# Patient Record
Sex: FEMALE | Race: WHITE
[De-identification: names, ages, dates, MRNs, and addresses within clinical notes are randomized per-mention and may not be internally consistent; named-entity substitution may affect disease eponyms.]

---
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
## 2018-04-11 NOTE — EMERGENCY ROOM VISIT NOTE
History


Report prepared by Acosta:  Ruth Singh


Under the Supervision of:  Dr. Saravanan Nguyen M.D.


First contact with patient:  14:07


Chief Complaint:  VOMITING


Stated Complaint:  SEVERE BACK PAIN,DIZZY,VOMITING,DIARRHEA





History of Present Illness


The patient is a 18 year old female who presents to the Emergency Room with 

complaints of nausea, vomiting, and diarrhea beginning 2 days ago. She states 

that her vomit has been green and yellow and states that she called her doctor 

who referred her here. The patient states that 3 weeks ago she also vomited 

blood. The patient reports having 5 episodes of diarrhea today and 5 episodes 

of diarrhea yesterday and reports that she is sometimes unable to make it to 

the bathroom. She also reports having the sweats, chills, fatigue, abdominal 

pain, cough, congestion and back pain. The patient reports that she feels like 

she has had to pee but is unable to, but denies burning with urination. She 

reports alcohol use about twice per week and states that she usually drinks 

about 5 beers when she goes out. The patient states that she used to be fine 

with 5 beers but states that recently she has been passing out after drinking 

that much. The patient states that she still has been drinking over the last 

several weeks even though she does has not been feeling well. She also reports 

smoking a pack of cigarettes per day. 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 

[employer] and states that she went home from work early because her feet 

were tingling and numb. She denies being around anyone sick at work. The 

patient states that about a month ago she was on medication for Herpes. She 

states that two years ago she was told she might have irritable bowel syndrome. 

The patient states that she is currently not on any medications daily.





   Source of History:  patient


   Onset:  2 days ago


   Position:  other (global)


   Quality:  other (nausea, vomiting, diarrhea )


   Associated Symptoms:  + fevers (sweats), + chills, + cough, + vomiting (

vomited blood 3 weeks ago ), + abdominal pain, + back pain, + fatigue, No 

urinary symptoms (burning with urination )





Review of Systems


See HPI for pertinent positives and negatives.  A total of ten systems were 

reviewed and were otherwise negative.





Past Medical & Surgical


Medical Problems:


(1) Asthma








Family History





Cancer


Diabetes mellitus


Heart disease


Hypertension





Social History


Smoking Status:  Current Every Day Smoker


Alcohol Use:  heavy


Drug Use:  none


Housing Status:  lives with family


Occupation Status:  employed





Current/Historical Medications


Scheduled


Famotidine (Pepcid), 20 MG PO BID


Ondasetron Odt (Zofran Odt), 4 MG SL Q6H





Allergies


Coded Allergies:  


     No Known Allergies (Unverified , 4/8/17)





Physical Exam


Vital Signs











  Date Time  Temp Pulse Resp B/P (MAP) Pulse Ox O2 Delivery O2 Flow Rate FiO2


 


4/11/18 17:12 36.7 77 20 132/84 100   


 


4/11/18 16:47  77 20 132/84 100 Room Air  


 


4/11/18 15:45  77 20  100 Room Air  


 


4/11/18 15:15  83 20  100 Room Air  


 


4/11/18 15:10    133/87    


 


4/11/18 15:00  83 22  99 Room Air  


 


4/11/18 14:35  76      


 


4/11/18 14:05 36.7 111 16 153/92 99 Room Air  











Physical Exam


GENERAL: Awake, alert, fatigued-appearing, in no distress


HENT: Normocephalic, atraumatic. Dry mucous membranes, otherwise oropharynx 

unremarkable.


EYES: Normal conjunctiva. Sclera non-icteric.


NECK: Supple. No nuchal rigidity. FROM. No JVD.


RESPIRATORY: Clear to auscultation.


CARDIAC: Sinus tachycardia. Extremities warm and well perfused. Pulses equal.


ABDOMEN: Soft, non-distended. Mild generalized abdominal pain. No peritoneal 

signs. Negative Zambrano's sign. No rebound or guarding. No masses.


RECTAL: Deferred.


MUSCULOSKELETAL: Chest examination reveals no tenderness. The back is 

symmetrical on inspection without obvious abnormality. There is no CVA 

tenderness to palpation. No joint edema. 


LOWER EXTREMITIES: Calves are equal size bilaterally and non-tender. No edema. 

No discoloration. 


NEURO: Normal sensorium. No sensory or motor deficits noted. 


SKIN: No rash or jaundice noted.





Medical Decision & Procedures


Laboratory Results


4/11/18 14:25








Red Blood Count 4.67, Mean Corpuscular Volume 92.7, Mean Corpuscular Hemoglobin 

33.2, Mean Corpuscular Hemoglobin Concent 35.8, Mean Platelet Volume 10.8, 

Neutrophils (%) (Auto) 66.8, Lymphocytes (%) (Auto) 21.9, Monocytes (%) (Auto) 

9.5, Eosinophils (%) (Auto) 1.4, Basophils (%) (Auto) 0.2, Neutrophils # (Auto) 

2.89, Lymphocytes # (Auto) 0.95, Monocytes # (Auto) 0.41, Eosinophils # (Auto) 

0.06, Basophils # (Auto) 0.01





4/11/18 14:25

















Test


  4/11/18


14:25 4/11/18


14:30 4/11/18


16:55


 


White Blood Count


  4.33 K/uL


(4.8-10.8) 


  


 


 


Red Blood Count


  4.67 M/uL


(4.2-5.4) 


  


 


 


Hemoglobin


  15.5 g/dL


(12.0-16.0) 


  


 


 


Hematocrit 43.3 % (37-47)   


 


Mean Corpuscular Volume


  92.7 fL


() 


  


 


 


Mean Corpuscular Hemoglobin


  33.2 pg


(25-34) 


  


 


 


Mean Corpuscular Hemoglobin


Concent 35.8 g/dl


(32-36) 


  


 


 


Platelet Count


  154 K/uL


(130-400) 


  


 


 


Mean Platelet Volume


  10.8 fL


(7.4-10.4) 


  


 


 


Neutrophils (%) (Auto) 66.8 %   


 


Lymphocytes (%) (Auto) 21.9 %   


 


Monocytes (%) (Auto) 9.5 %   


 


Eosinophils (%) (Auto) 1.4 %   


 


Basophils (%) (Auto) 0.2 %   


 


Neutrophils # (Auto)


  2.89 K/uL


(1.4-6.5) 


  


 


 


Lymphocytes # (Auto)


  0.95 K/uL


(1.2-3.4) 


  


 


 


Monocytes # (Auto)


  0.41 K/uL


(0.11-0.59) 


  


 


 


Eosinophils # (Auto)


  0.06 K/uL


(0-0.5) 


  


 


 


Basophils # (Auto)


  0.01 K/uL


(0-0.2) 


  


 


 


RDW Standard Deviation


  43.5 fL


(36.4-46.3) 


  


 


 


RDW Coefficient of Variation


  12.9 %


(11.5-14.5) 


  


 


 


Immature Granulocyte % (Auto) 0.2 %   


 


Immature Granulocyte # (Auto)


  0.01 K/uL


(0.00-0.02) 


  


 


 


Urine Color VIPIN   


 


Urine Appearance


  SL CLOUDY


(CLEAR) 


  


 


 


Urine pH 6.5 (4.5-7.5)   


 


Urine Specific Gravity


  1.020


(1.000-1.030) 


  


 


 


Urine Protein 2+ (NEG)   


 


Urine Glucose (UA) NEG (NEG)   


 


Urine Ketones TRACE (NEG)   


 


Urine Occult Blood 3+ (NEG)   


 


Urine Nitrite NEG (NEG)   


 


Urine Bilirubin 1+ (NEG)   


 


Urine Urobilinogen NEG (NEG)   


 


Urine Leukocyte Esterase TRACE (NEG)   


 


Urine RBC


  5-10 /hpf


(0-4) 


  


 


 


Urine WBC 1-5 /hpf (0-5)   


 


Urine Epithelial Cells >30 /lpf (0-5)   


 


Urine Bacteria NEG (NEG)   


 


Urine Mucus


  PRESENT (NONE


PRSENT) 


  


 


 


Urine Pregnancy Test NEG (NEG)   


 


Anion Gap


  7.0 mmol/L


(3-11) 


  


 


 


Est Creatinine Clear Calc


Drug Dose 70.7 ml/min 


  


  


 


 


Estimated GFR (


American) 88.5 


  


  


 


 


Estimated GFR (Non-


American 76.4 


  


  


 


 


BUN/Creatinine Ratio 12.0 (10-20)   


 


Calcium Level


  9.0 mg/dl


(8.5-10.1) 


  


 


 


Total Bilirubin


  0.8 mg/dl


(0.2-1) 


  


 


 


Direct Bilirubin


  0.2 mg/dl


(0-0.2) 


  


 


 


Aspartate Amino Transf


(AST/SGOT) 16 U/L (15-37) 


  


  


 


 


Alanine Aminotransferase


(ALT/SGPT) 21 U/L (12-78) 


  


  


 


 


Alkaline Phosphatase


  98 U/L


() 


  


 


 


Total Protein


  7.5 gm/dl


(6.4-8.2) 


  


 


 


Albumin


  4.0 gm/dl


(3.4-5.0) 


  


 


 


Lipase


  87 U/L


() 


  


 


 


Influenza Type A (RT-PCR)


  


  Neg for Influ


A (NEG) 


 


 


Influenza Type B (RT-PCR)


  


  Neg for Influ


B (NEG) 


 





Laboratory results reviewed by me





Medications Administered











 Medications


  (Trade)  Dose


 Ordered  Sig/Marielos


 Route  Start Time


 Stop Time Status Last Admin


Dose Admin


 


 Sodium Chloride  2,000 ml @ 


 999 mls/hr  Q2H1M STAT


 IV  4/11/18 14:18


 4/11/18 16:18 DC 4/11/18 14:34


999 MLS/HR


 


 Ondansetron HCl


  (Zofran Inj)  4 mg  NOW  STAT


 IV  4/11/18 14:18


 4/11/18 14:22 DC 4/11/18 14:33


4 MG


 


 Famotidine


  (Pepcid Tab)  20 mg  NOW  ONCE


 PO  4/11/18 14:30


 4/11/18 14:31 DC 4/11/18 14:33


20 MG











ED Course


1409: The patient was evaluated in room B9. A complete history and physical 

exam was performed.





1629: I reevaluated the patient and she is feeling better. 





1657: I reevaluated the patient. Discussed results and discharge instructions: 

She verbalized understanding and agreement. The patient is ready for discharge.





Medical Decision


I reviewed the patient's past medical history, medications, and the nursing 

notes as described above.





Differential diagnosis:


Etiologies such as appendicitis, diverticulitis, PUD, biliary pathology, UTI, 

pancreatitis, obstruction, mesenteric ischemia, aortic pathology, infections, 

inflammatory bowel disease, renal colic, as well as others were entertained. 





The patient is a 18-year-old woman who presents emergency department with 

complaint of nausea vomiting diarrhea over the past couple of days in the 

setting of feeling generally fatigued with decreased energy over the past few 

weeks with a single episode of blood-tinged emesis 3 weeks ago per hpi.  Of note

, the patient reports that she does drink fairly regularly however "over the 

past few weeks she has drank 5 beers and she feels that she is going to pass 

out."  Patient also smokes 1 pack per day.  She also reports that she completed 

a course of Valtrex for HSV, IM CTX and Azithro and course of doxycycline for 

GCC. On arrival the patient is fatigued appearing but no acute distress, 

afebrile with heart rate 110s and vital signs otherwise stable.  Labs 

unremarkable. Bedside ultrasound of the patient's gallbladder negative for 

gallstones or pericholecystic fluid.  Given the patient's history I offered the 

patient the option for a pelvic exam however she reported she had to leave at 5 

PM to  her daughter.  Thus we agreed that she will perform a self swab 

and we will send for GC chlamydia. Given that she has no vaginal symptoms at 

this time we agree we will wait for test results to inform treatment. Otherwise

, the patient's sx are most c/w viral gastroenteritis. Findings and plan for 

follow-up reviewed with patient. Patient agreeable and d/c'd per discharge 

instructions.





Medication Reconcilliation


Current Medication List:  was personally reviewed by me





Blood Pressure Screening


Patient's blood pressure:  Elevated blood pressure


Blood pressure disposition:  Elevated BP felt to be situational





Impression





 Primary Impression:  


 Gastroenteritis





Scribe Attestation


The scribe's documentation has been prepared under my direction and personally 

reviewed by me in its entirety. I confirm that the note above accurately 

reflects all work, treatment, procedures, and medical decision making performed 

by me.





Departure Information


Dispostion


Home / Self-Care





Prescriptions





Famotidine (PEPCID) 20 Mg Tab


20 MG PO BID for 7 Days, #14 TAB


   Prov: Saravanan Nguyen M.D.         4/11/18 


Ondasetron Odt (ZOFRAN ODT) 4 Mg Tab


4 MG SL Q6H for Nausea, #10 TAB


   Prov: Saravanan Nguyen M.D.         4/11/18





Referrals


Paul Virk M.D. (PCP)





Forms


HOME CARE DOCUMENTATION FORM,                                                 

               IMPORTANT VISIT INFORMATION





Patient Instructions


ED Gastroenteritis Viral, My [facility]





Additional Instructions





Please follow up with your primary care physician in the next 1-3 days for re-

evaluation.





You likely have a viral gastroenteritis.


Otherwise, your exam and lab results did not show signs of an emergent 

condition at this time.


You have cultures pending and you will receive a phone call only if the results 

are positive.





Acetaminophen or ibuprofen for pain and fevers as needed.


Zofran as needed for nausea.


Pepcid for acid reduction.


Drink plenty of fluids to ensure hydration.





You should should curb or quit your regular heavy alcohol use as this will 

improve your symptoms and avoid any future medical complications.  Seek help if 

you need it.


You should also consider stopping smoking as this will also improve your 

symptoms and avoid any future medical complications.





Return to the emergency department for worsening symptoms as described in the 

accompanying instructions.

## 2018-08-28 ENCOUNTER — HOSPITAL ENCOUNTER (EMERGENCY)
Dept: HOSPITAL 45 - C.EDB | Age: 18
Discharge: HOME | End: 2018-08-28
Payer: SELF-PAY

## 2018-08-28 VITALS — OXYGEN SATURATION: 100 % | DIASTOLIC BLOOD PRESSURE: 84 MMHG | HEART RATE: 107 BPM | SYSTOLIC BLOOD PRESSURE: 151 MMHG

## 2018-08-28 VITALS
WEIGHT: 119.93 LBS | WEIGHT: 119.93 LBS | HEIGHT: 62.99 IN | HEIGHT: 62.99 IN | BODY MASS INDEX: 21.25 KG/M2 | BODY MASS INDEX: 21.25 KG/M2

## 2018-08-28 VITALS — TEMPERATURE: 98.42 F

## 2018-08-28 DIAGNOSIS — E87.6: ICD-10-CM

## 2018-08-28 DIAGNOSIS — R10.30: Primary | ICD-10-CM

## 2018-08-28 DIAGNOSIS — F17.200: ICD-10-CM

## 2018-08-28 DIAGNOSIS — Z87.42: ICD-10-CM

## 2018-08-28 LAB
ALBUMIN SERPL-MCNC: 4.2 GM/DL (ref 3.4–5)
ALP SERPL-CCNC: 87 U/L (ref 45–117)
ALT SERPL-CCNC: 25 U/L (ref 12–78)
AST SERPL-CCNC: 13 U/L (ref 15–37)
BASOPHILS # BLD: 0.03 K/UL (ref 0–0.2)
BASOPHILS NFR BLD: 0.4 %
BUN SERPL-MCNC: 5 MG/DL (ref 7–18)
CALCIUM SERPL-MCNC: 8.8 MG/DL (ref 8.5–10.1)
CO2 SERPL-SCNC: 22 MMOL/L (ref 21–32)
CREAT SERPL-MCNC: 1.16 MG/DL (ref 0.6–1.2)
EOS ABS #: 0.07 K/UL (ref 0–0.5)
EOSINOPHIL NFR BLD AUTO: 188 K/UL (ref 130–400)
GLUCOSE SERPL-MCNC: 102 MG/DL (ref 70–99)
HCT VFR BLD CALC: 42.7 % (ref 37–47)
HGB BLD-MCNC: 15.3 G/DL (ref 12–16)
IG#: 0.01 K/UL (ref 0–0.02)
IMM GRANULOCYTES NFR BLD AUTO: 17.2 %
LIPASE: 75 U/L (ref 73–393)
LYMPHOCYTES # BLD: 1.24 K/UL (ref 1.2–3.4)
MCH RBC QN AUTO: 33.8 PG (ref 25–34)
MCHC RBC AUTO-ENTMCNC: 35.8 G/DL (ref 32–36)
MCV RBC AUTO: 94.3 FL (ref 80–100)
MONO ABS #: 0.46 K/UL (ref 0.11–0.59)
MONOCYTES NFR BLD: 6.4 %
NEUT ABS #: 5.42 K/UL (ref 1.4–6.5)
NEUTROPHILS # BLD AUTO: 1 %
NEUTROPHILS NFR BLD AUTO: 74.9 %
PMV BLD AUTO: 10.5 FL (ref 7.4–10.4)
POTASSIUM SERPL-SCNC: 3.2 MMOL/L (ref 3.5–5.1)
PROT SERPL-MCNC: 7.7 GM/DL (ref 6.4–8.2)
RED CELL DISTRIBUTION WIDTH CV: 12.2 % (ref 11.5–14.5)
RED CELL DISTRIBUTION WIDTH SD: 41.6 FL (ref 36.4–46.3)
SODIUM SERPL-SCNC: 138 MMOL/L (ref 136–145)
WBC # BLD AUTO: 7.23 K/UL (ref 4.8–10.8)

## 2018-08-28 NOTE — EMERGENCY ROOM VISIT NOTE
History


Report prepared by Acosta:  Danae Bella


Under the Supervision of:  Dr. Valentín Zabala D.O.


First contact with patient:  14:25


Chief Complaint:  OTHER COMPLAINT


Stated Complaint:  DIZZY, SWEATING, LOST TAMPON...NAUSEA, PAIN





History of Present Illness


The patient is a 18 year old female who presents to the Emergency Room with 

complaints of possible TSS beginning 2 days pta. She notes she had sex 2 days 

pta and forgot she had a tampon in. The next morning when she woke up, she was 

nauseous and had some pain with urination, and realized her tampon was still 

in. She reports that she bought a douche as "it smelled bad" and she hoped it 

would loosen the tampon, and she eventually pulled it out but states it was 

upside down and misshapen. The patient states she is nauseous, dizzy, and has 

abdominal pain and has a difficult time focusing. She states she has had herpes 

and chlamydia in the past and her last BM was this morning. She also thinks her 

LNMP is currently occurring but is unsure as she thinks she could have 

hematuria.





   Source of History:  patient


   Onset:  2 days pta


   Position:  other (vaginal)


   Quality:  other (possible TSS)


   Timing:  other (episode)


   Associated Symptoms:  + nausea, + abdominal pain, + urinary symptoms (pain 

with urination), No melena


Note:


Positive dizziness and difficult time focusing.





Review of Systems


See HPI for pertinent positives & negatives. A total of 10 systems reviewed and 

were otherwise negative.





Past Medical & Surgical


Medical Problems:


(1) Asthma


(2) Herpes








Family History





Cancer


Diabetes mellitus


Heart disease


Hypertension





Social History


Smoking Status:  Current Every Day Smoker


Alcohol Use:  heavy


Drug Use:  none


Marital Status:  in relationship


Housing Status:  lives with family


Occupation Status:  employed





Current/Historical Medications


No Active Prescriptions or Reported Meds





Allergies


Coded Allergies:  


     No Known Allergies (Unverified , 8/28/18)





Physical Exam


Vital Signs











  Date Time  Temp Pulse Resp B/P (MAP) Pulse Ox O2 Delivery O2 Flow Rate FiO2


 


8/28/18 16:35  107  151/84 100   


 


8/28/18 15:38  89 16 163/98 99 Room Air  


 


8/28/18 14:21 36.9 105 18 147/96 97 Room Air  











Physical Exam


GENERAL: Sitting up in bed, alert, well appearing, well nourished, no distress, 

non-toxic 


EYE EXAM: normal conjunctiva. PERRL and EOM's grossly intact. Left pupil larger 

than right. Both are round and reactive 


OROPHARYNX: no exudate, no erythema, lips, buccal mucosa, and tongue normal and 

mucous membranes are moist


NECK: supple, no nuchal rigidity, no adenopathy, non-tender


LUNGS: Clear to auscultation. Normal chest wall mechanics


HEART: no murmurs, S1 normal and S2 normal 


ABDOMEN: abdomen soft, non-tender, normo-active bowel sounds, no masses, no 

rebound or guarding. 


BACK: Back is symmetrical on inspection and there is no deformity, no midline 

tenderness, no CVA tenderness. 


SKIN: no rashes and no bruising 


UPPER EXTREMITIES: upper extremities are grossly normal. 


LOWER EXTREMITIES: No pitting edema. 


NEURO EXAM: Normal sensorium, cranial nerves II-XII intact, normal speech,  no 

weakness of arms, no weakness of legs.  No drift.  Finger to nose intact.





Medical Decision & Procedures


Laboratory Results


8/28/18 14:45








Red Blood Count 4.53, Mean Corpuscular Volume 94.3, Mean Corpuscular Hemoglobin 

33.8, Mean Corpuscular Hemoglobin Concent 35.8, Mean Platelet Volume 10.5, 

Neutrophils (%) (Auto) 74.9, Lymphocytes (%) (Auto) 17.2, Monocytes (%) (Auto) 

6.4, Eosinophils (%) (Auto) 1.0, Basophils (%) (Auto) 0.4, Neutrophils # (Auto) 

5.42, Lymphocytes # (Auto) 1.24, Monocytes # (Auto) 0.46, Eosinophils # (Auto) 

0.07, Basophils # (Auto) 0.03





8/28/18 14:45

















Test


  8/28/18


14:37 8/28/18


14:45 8/28/18


16:07


 


Urine Color DK YELLOW   


 


Urine Appearance CLEAR (CLEAR)   


 


Urine pH 6.0 (4.5-7.5)   


 


Urine Specific Gravity


  1.018


(1.000-1.030) 


  


 


 


Urine Protein NEG (NEG)   


 


Urine Glucose (UA) NEG (NEG)   


 


Urine Ketones TRACE (NEG)   


 


Urine Occult Blood 3+ (NEG)   


 


Urine Nitrite NEG (NEG)   


 


Urine Bilirubin NEG (NEG)   


 


Urine Urobilinogen NEG (NEG)   


 


Urine Leukocyte Esterase SMALL (NEG)   


 


Urine WBC (Auto) 1-5 /hpf (0-5)   


 


Urine RBC (Auto) 0-4 /hpf (0-4)   


 


Urine Hyaline Casts (Auto) 1-5 /lpf (0-5)   


 


Urine Epithelial Cells (Auto) >30 /lpf (0-5)   


 


Urine Bacteria (Auto) NEG (NEG)   


 


Urine Pregnancy Test NEG (NEG)   


 


White Blood Count


  


  7.23 K/uL


(4.8-10.8) 


 


 


Red Blood Count


  


  4.53 M/uL


(4.2-5.4) 


 


 


Hemoglobin


  


  15.3 g/dL


(12.0-16.0) 


 


 


Hematocrit  42.7 % (37-47)  


 


Mean Corpuscular Volume


  


  94.3 fL


() 


 


 


Mean Corpuscular Hemoglobin


  


  33.8 pg


(25-34) 


 


 


Mean Corpuscular Hemoglobin


Concent 


  35.8 g/dl


(32-36) 


 


 


Platelet Count


  


  188 K/uL


(130-400) 


 


 


Mean Platelet Volume


  


  10.5 fL


(7.4-10.4) 


 


 


Neutrophils (%) (Auto)  74.9 %  


 


Lymphocytes (%) (Auto)  17.2 %  


 


Monocytes (%) (Auto)  6.4 %  


 


Eosinophils (%) (Auto)  1.0 %  


 


Basophils (%) (Auto)  0.4 %  


 


Neutrophils # (Auto)


  


  5.42 K/uL


(1.4-6.5) 


 


 


Lymphocytes # (Auto)


  


  1.24 K/uL


(1.2-3.4) 


 


 


Monocytes # (Auto)


  


  0.46 K/uL


(0.11-0.59) 


 


 


Eosinophils # (Auto)


  


  0.07 K/uL


(0-0.5) 


 


 


Basophils # (Auto)


  


  0.03 K/uL


(0-0.2) 


 


 


RDW Standard Deviation


  


  41.6 fL


(36.4-46.3) 


 


 


RDW Coefficient of Variation


  


  12.2 %


(11.5-14.5) 


 


 


Immature Granulocyte % (Auto)  0.1 %  


 


Immature Granulocyte # (Auto)


  


  0.01 K/uL


(0.00-0.02) 


 


 


Anion Gap


  


  11.0 mmol/L


(3-11) 


 


 


Est Creatinine Clear Calc


Drug Dose 


  64.0 ml/min 


  


 


 


Estimated GFR (


American) 


  78.5 


  


 


 


Estimated GFR (Non-


American 


  67.7 


  


 


 


BUN/Creatinine Ratio  4.1 (10-20)  


 


Calcium Level


  


  8.8 mg/dl


(8.5-10.1) 


 


 


Total Bilirubin


  


  0.5 mg/dl


(0.2-1) 


 


 


Direct Bilirubin


  


  0.2 mg/dl


(0-0.2) 


 


 


Aspartate Amino Transf


(AST/SGOT) 


  13 U/L (15-37) 


  


 


 


Alanine Aminotransferase


(ALT/SGPT) 


  25 U/L (12-78) 


  


 


 


Alkaline Phosphatase


  


  87 U/L


() 


 


 


Total Protein


  


  7.7 gm/dl


(6.4-8.2) 


 


 


Albumin


  


  4.2 gm/dl


(3.4-5.0) 


 


 


Lipase


  


  75 U/L


() 


 














 Date/Time


Source Procedure


Growth Status


 


 


 8/28/18 16:07


Cervix Scraping Trichomonas Preparation - Final Complete





Laboratory results per my review.





Medications Administered











 Medications


  (Trade)  Dose


 Ordered  Sig/Marielos


 Route  Start Time


 Stop Time Status Last Admin


Dose Admin


 


 Sodium Chloride  1,000 ml @ 


 999 mls/hr  Q1H1M STAT


 IV  8/28/18 14:32


 8/28/18 15:32 DC 8/28/18 14:46


999 MLS/HR


 


 Ketorolac


 Tromethamine


  (Toradol Inj)  30 mg  NOW  STAT


 IV  8/28/18 14:33


 8/28/18 14:34 DC 8/28/18 14:46


30 MG











ED Course


ED COURSE: 


Vital signs were reviewed and showed hypertension situationally 


The patients medical record was reviewed


The above diagnostic studies were performed and reviewed.


ED treatments and interventions as stated above. 





1426: The patient was evaluated in room B10. A complete history and physical 

examination was performed.





1432: Ordered Sodium Chloride 1000 ml @ 999 mls/hr IV





1433: Ordered Toradol Inj 30 mg IV





1518: Discussed with the change nurse about the patient's triage note. The 

patient is not a 302. 





1600: I performed a pelvic exam at this time. It showed normal external 

genitalia. Normal vaginal mucosa. No discharge with the exception of a small 

amount of blood. No adnexal or cervical motion tenderness. 





1610: Upon reevaluation, the patient is feeling better. I discussed my findings 

with the patient and she understands and agrees with the treatment plan.   


Based on the patients age, coexisting illnesses, exam and lab findings the 

decision to treat as an outpatient was made.


The patient remained stable while under my care.


The patient appeared well at the time of discharge.





Medical Decision


Differential diagnoses includes but is not limited to gastritis, peptic ulcer 

disease, GERD, gallbladder disease, pancreatitis, small bowel obstruction, 

acute coronary syndrome, pericarditis, ischemic bowel, irritable bowel disease, 

irritable bowel syndrome, appendicitis, diverticulitis, malignancy, hernia, 

urinary tract infection, torsion, [pregnancy/ectopic pregnancy (if female)], 

perforation, trauma, infectious. 





Patient is a 18-year-old female presents to ER for lower abdominal pain.  She 

notes she left a tampon in for 2 days after having sex.  She was able to pull 

it out.  She denies any fevers.  No chest pain or shortness of breath.  CBC 

along with BMP has mild hypokalemia.  Bilirubin and LFTs along lipase was 

normal.  UA was negative.  Pregnancy was negative.  Ultrasound shows several 

ovarian cyst.  No signs of torsion.  Pelvic exam was unremarkable without 

obvious signs of infection.  Cultures were sent.  Patient was given Toradol and 

fluids and did feel significantly better.  She is updated bedside discharge 

follow-up PCP as an outpatient. Discussed with Pt concerning signs and symptoms 

to watch out for. Pt was instructed to follow up with their PCP and discussed 

with the patient their option to return to the ED at anytime for persistent or 

worsening symptoms. The appropriate anticipatory guidance and out-patient 

management, including indications for return to the emergency department, were 

explained at length to the patient and understood.





Medication Reconcilliation


Current Medication List:  was personally reviewed by me





Blood Pressure Screening


Patient's blood pressure:  Elevated blood pressure


Blood pressure disposition:  Elevated BP felt to be situational





Impression





 Primary Impression:  


 Abdominal pain


 Additional Impression:  


 Hypokalemia





Scribe Attestation


The scribe's documentation has been prepared under my direction and personally 

reviewed by me in its entirety. I confirm that the note above accurately 

reflects all work, treatment, procedures, and medical decision making performed 

by me.





Departure Information


Dispostion


Home / Self-Care





Prescriptions





No Active Prescriptions or Reported Meds





Referrals


Paul Virk M.D. (PCP)





Forms


HOME CARE DOCUMENTATION FORM,                                                 

               IMPORTANT VISIT INFORMATION, WORK / SCHOOL INSTRUCTIONS





Patient Instructions


My [facility]





Additional Instructions





Please follow up with your primary care doctor with in the next 24 hours.  Any 

worsening of your symptoms, please return to the ED immediately. This includes 

any fevers greater than 100.4, worsening pain, chest pain, shortness breath, 

persistent nausea, vomiting, unable to eat or drink, vaginal bleeding, new 

vaginal discharge, or any other concerning signs or symptoms from your 

standpoint.








Please take Tylenol or Motrin as needed for pain.





Problem Qualifiers








 Primary Impression:  


 Abdominal pain


 Abdominal location:  unspecified location  Qualified Codes:  R10.9 - 

Unspecified abdominal pain

## 2020-10-16 NOTE — DIAGNOSTIC IMAGING REPORT
PELVIC COMPLETE NON OB



CLINICAL HISTORY: FB ? lower abd pain  PAIN



COMPARISON STUDY:  None



FINDINGS: 

The uterus measured 7 cm.

The endometrial stripe measured 3 mm.

The right ovary measured 2.5 cm maximum dimension. Normal vascular flow. Several

subcentimeters follicular cyst..

The left ovary measured 3.1 cm maximum dimension with normal vascular flow.

Several small subcentimeter follicular cyst..

There is no ultrasonographic evidence of ovarian torsion. It should be noted

that ovarian torsion can be present with normal Doppler ultrasonographic

findings.

There was no evidence of pathologic free pelvic fluid.



IMPRESSION:  Several small ovarian follicular cysts bilaterally. Otherwise

normal study.









The above report was generated using voice recognition software.  It may contain

grammatical, syntax or spelling errors.







Electronically signed by:  Paul Valladares M.D.

8/28/2018 3:41 PM



Dictated Date/Time:  8/28/2018 3:40 PM
never used